# Patient Record
Sex: MALE | Race: WHITE
[De-identification: names, ages, dates, MRNs, and addresses within clinical notes are randomized per-mention and may not be internally consistent; named-entity substitution may affect disease eponyms.]

---

## 2019-10-21 ENCOUNTER — HOSPITAL ENCOUNTER (OUTPATIENT)
Dept: HOSPITAL 92 - CT | Age: 74
Discharge: HOME | End: 2019-10-21
Attending: UROLOGY
Payer: MEDICARE

## 2019-10-21 DIAGNOSIS — C61: Primary | ICD-10-CM

## 2019-10-21 DIAGNOSIS — N28.1: ICD-10-CM

## 2019-10-21 LAB — ESTIMATED GFR-MDRD - POC: (no result)

## 2019-10-21 PROCEDURE — 78306 BONE IMAGING WHOLE BODY: CPT

## 2019-10-21 PROCEDURE — A9503 TC99M MEDRONATE: HCPCS

## 2019-10-21 PROCEDURE — 82565 ASSAY OF CREATININE: CPT

## 2019-10-21 PROCEDURE — 74178 CT ABD&PLV WO CNTR FLWD CNTR: CPT

## 2019-10-21 NOTE — NM
WHOLE BODY BONE SCAN:



HISTORY: Prostate cancer 



RADIOPHARMACEUTICAL: 32.6 mCi technetium-99m MDP injected intravenously. 



COMPARISON: None



FINDINGS:

There is increased uptake in the shoulders, sternoclavicular joints, , and feet  consistent with dege
nerative changes. 



No other abnormal areas of tracer localization seen in the skeleton to suggest metastatic disease. 



Tracer excretion through the kidneys is within normal limits.



 IMPRESSION:

 No scintigraphic evidence of osseous metastatic disease. 



Reported By: Mikey Ahmadi 

Electronically Signed:  10/21/2019 12:52 PM

## 2019-10-21 NOTE — CT
CT Abdomen Pelvis W WO con: 10/21/2019 12:00 AM



CLINICAL HISTORY: Leakage while urinating. History of prostate cancer status post prostatectomy.



TECHNIQUE: 

Multiple contiguous axial images were obtained and a CT of the abdomen and pelvis without and with IV
 contrast. Postcontrast images were obtained in the nephrographic and excretory phases. Sagittal

and coronal reformats were performed.



COMPARISON: None.



FINDINGS: 



Kidneys and Urinary Tract: 



Right kidney and ureter: No calculi. No hydronephrosis or hydroureter. No renal mass or other lesions
. No urothelial lesions: no filling defect, dilation, stricture or wall thickening.

Left kidney and ureter: No calculi. No hydronephrosis or hydroureter. Subcentimeter hypodensity likel
y represents a cyst. No urothelial lesions: no filling defect, dilation, stricture or wall

thickening.

Urinary bladder: Normal, no calculi, mass or other lesions.



Remainder of Abdomen and Pelvis:



Liver: Normal.

Gallbladder and biliary system: Normal. No CT evident gallstones. No biliary ductal dilatation.

Spleen: Normal. 

Pancreas: Normal. 

Adrenal glands: Normal. 

GI tract: Normal. 

Abdominal aorta and its major branches: Atherosclerotic calcifications  No aneurysm.

Peritoneum/retroperitoneum: Normal.  No ascites. No adenopathy.

Pelvic structures: Normal. No pelvic lymphadenopathy.

Body wall and musculoskeletal: Normal. 

Visualized lower thorax: Normal. No pulmonary parenchymal 

mass or pleural effusion.



IMPRESSION:  



Tiny subcentimeter left renal cyst; otherwise unremarkable exam.



Reported By: Mikey Ahmadi 

Electronically Signed:  10/21/2019 9:35 AM

## 2023-06-27 ENCOUNTER — HOSPITAL ENCOUNTER (INPATIENT)
Dept: HOSPITAL 92 - SURG A | Age: 78
LOS: 5 days | Discharge: HOME | DRG: 236 | End: 2023-07-02
Attending: THORACIC SURGERY (CARDIOTHORACIC VASCULAR SURGERY) | Admitting: THORACIC SURGERY (CARDIOTHORACIC VASCULAR SURGERY)
Payer: MEDICARE

## 2023-06-27 VITALS — BODY MASS INDEX: 25.9 KG/M2

## 2023-06-27 DIAGNOSIS — Z90.49: ICD-10-CM

## 2023-06-27 DIAGNOSIS — K21.9: ICD-10-CM

## 2023-06-27 DIAGNOSIS — Z79.82: ICD-10-CM

## 2023-06-27 DIAGNOSIS — Z79.4: ICD-10-CM

## 2023-06-27 DIAGNOSIS — Z98.890: ICD-10-CM

## 2023-06-27 DIAGNOSIS — I48.91: ICD-10-CM

## 2023-06-27 DIAGNOSIS — E11.9: ICD-10-CM

## 2023-06-27 DIAGNOSIS — Z79.899: ICD-10-CM

## 2023-06-27 DIAGNOSIS — I10: ICD-10-CM

## 2023-06-27 DIAGNOSIS — Z95.5: ICD-10-CM

## 2023-06-27 DIAGNOSIS — I25.10: Primary | ICD-10-CM

## 2023-06-27 DIAGNOSIS — E78.2: ICD-10-CM

## 2023-06-27 LAB
ANALYZER IN CARDIO: (no result)
ANION GAP SERPL CALC-SCNC: 16 MMOL/L (ref 10–20)
ANION GAP SERPL CALC-SCNC: 9 MMOL/L (ref 10–20)
APTT PPP: 35.8 SEC (ref 22.9–36.1)
BASE EXCESS STD BLDA CALC-SCNC: -1.6 MEQ/L
BASE EXCESS STD BLDA CALC-SCNC: -3.6 MEQ/L
BASE EXCESS STD BLDA CALC-SCNC: -3.9 MEQ/L
BASE EXCESS STD BLDA CALC-SCNC: -4.3 MEQ/L
BASE EXCESS STD BLDA CALC-SCNC: -5.4 MEQ/L
BASE EXCESS STD BLDA CALC-SCNC: -5.5 MEQ/L
BASOPHILS # BLD AUTO: 0 THOU/UL (ref 0–0.2)
BASOPHILS NFR BLD AUTO: 0.2 % (ref 0–1)
BUN SERPL-MCNC: 17 MG/DL (ref 8.4–25.7)
BUN SERPL-MCNC: 21 MG/DL (ref 8.4–25.7)
CA-I BLDA-SCNC: 1.1 MMOL/L (ref 1.12–1.3)
CA-I BLDA-SCNC: 1.14 MMOL/L (ref 1.12–1.3)
CA-I BLDA-SCNC: 1.15 MMOL/L (ref 1.12–1.3)
CA-I BLDA-SCNC: 1.46 MMOL/L (ref 1.12–1.3)
CALCIUM SERPL-MCNC: 7.9 MG/DL (ref 7.8–10.44)
CALCIUM SERPL-MCNC: 9.5 MG/DL (ref 7.8–10.44)
CHLORIDE SERPL-SCNC: 106 MMOL/L (ref 98–107)
CHLORIDE SERPL-SCNC: 117 MMOL/L (ref 98–107)
CO2 SERPL-SCNC: 17 MMOL/L (ref 23–31)
CO2 SERPL-SCNC: 21 MMOL/L (ref 23–31)
CREAT CL PREDICTED SERPL C-G-VRATE: 65 ML/MIN (ref 70–130)
CREAT CL PREDICTED SERPL C-G-VRATE: 91 ML/MIN (ref 70–130)
EOSINOPHIL # BLD AUTO: 0.2 THOU/UL (ref 0–0.7)
EOSINOPHIL NFR BLD AUTO: 1 % (ref 0–10)
GLUCOSE SERPL-MCNC: 170 MG/DL (ref 83–110)
GLUCOSE SERPL-MCNC: 334 MG/DL (ref 83–110)
HCO3 BLDA-SCNC: 19 MEQ/L (ref 22–28)
HCO3 BLDA-SCNC: 20.1 MEQ/L (ref 22–28)
HCO3 BLDA-SCNC: 20.2 MEQ/L (ref 22–28)
HCO3 BLDA-SCNC: 20.5 MEQ/L (ref 22–28)
HCO3 BLDA-SCNC: 20.7 MEQ/L (ref 22–28)
HCO3 BLDA-SCNC: 22.5 MEQ/L (ref 22–28)
HCT VFR BLDA CALC: 27 % (ref 42–52)
HCT VFR BLDA CALC: 28 % (ref 42–52)
HCT VFR BLDA CALC: 34 % (ref 42–52)
HCT VFR BLDA CALC: 34 % (ref 42–52)
HCT VFR BLDA CALC: 36 % (ref 42–52)
HCT VFR BLDA CALC: 41 % (ref 42–52)
HGB BLD-MCNC: 10.6 G/DL (ref 14–18)
HGB BLD-MCNC: 11.4 G/DL (ref 14–18)
HGB BLD-MCNC: 14.8 G/DL (ref 14–18)
HGB BLDA-MCNC: 11.4 G/DL (ref 14–18)
HGB BLDA-MCNC: 11.4 G/DL (ref 14–18)
HGB BLDA-MCNC: 12.3 G/DL (ref 14–18)
HGB BLDA-MCNC: 14 G/DL (ref 14–18)
HGB BLDA-MCNC: 9.1 G/DL (ref 14–18)
HGB BLDA-MCNC: 9.5 G/DL (ref 14–18)
INR PPP: 1.5
LYMPHOCYTES NFR BLD AUTO: 6 % (ref 21–51)
MCH RBC QN AUTO: 33.9 PG (ref 27–31)
MCH RBC QN AUTO: 34.8 PG (ref 27–31)
MCV RBC AUTO: 98 FL (ref 78–98)
MCV RBC AUTO: 99.1 FL (ref 78–98)
MONOCYTES # BLD AUTO: 0.9 THOU/UL (ref 0.11–0.59)
MONOCYTES NFR BLD AUTO: 5 % (ref 0–10)
NEUTROPHILS # BLD AUTO: 15.4 THOU/UL (ref 1.4–6.5)
NEUTROPHILS NFR BLD AUTO: 86.8 % (ref 42–75)
O2 A-A PPRESDIFF RESPIRATORY: 124.12 MMHG (ref 0–20)
O2 A-A PPRESDIFF RESPIRATORY: 240.53 MMHG (ref 0–20)
PCO2 BLDA: 32.1 MMHG (ref 35–45)
PCO2 BLDA: 34.1 MMHG (ref 35–45)
PCO2 BLDA: 34.3 MMHG (ref 35–45)
PCO2 BLDA: 35.2 MMHG (ref 35–45)
PCO2 BLDA: 35.9 MMHG (ref 35–45)
PCO2 BLDA: 42 MMHG (ref 35–45)
PH BLDA: 7.31 [PH] (ref 7.35–7.45)
PH BLDA: 7.37 [PH] (ref 7.35–7.45)
PH BLDA: 7.38 [PH] (ref 7.35–7.45)
PH BLDA: 7.38 [PH] (ref 7.35–7.45)
PH BLDA: 7.42 [PH] (ref 7.35–7.45)
PH BLDA: 7.42 [PH] (ref 7.35–7.45)
PLATELET # BLD AUTO: 117 10X3/UL (ref 130–400)
PLATELET # BLD AUTO: 182 10X3/UL (ref 130–400)
PO2 BLDA: 116.2 MMHG (ref 70–?)
PO2 BLDA: 144.4 MMHG (ref 70–?)
PO2 BLDA: 297.6 MMHG (ref 70–?)
PO2 BLDA: 345.6 MMHG (ref 70–?)
PO2 BLDA: 409.2 MMHG (ref 70–?)
PO2 BLDA: 468.3 MMHG (ref 70–?)
POTASSIUM BLD-SCNC: 3.18 MMOL/L (ref 3.7–5.3)
POTASSIUM BLD-SCNC: 3.41 MMOL/L (ref 3.7–5.3)
POTASSIUM BLD-SCNC: 3.59 MMOL/L (ref 3.7–5.3)
POTASSIUM BLD-SCNC: 3.85 MMOL/L (ref 3.7–5.3)
POTASSIUM BLD-SCNC: 4.04 MMOL/L (ref 3.7–5.3)
POTASSIUM BLD-SCNC: 4.8 MMOL/L (ref 3.7–5.3)
POTASSIUM SERPL-SCNC: 3.4 MMOL/L (ref 3.5–5.1)
POTASSIUM SERPL-SCNC: 4 MMOL/L (ref 3.5–5.1)
POTASSIUM SERPL-SCNC: 5.1 MMOL/L (ref 3.5–5.1)
PROTHROMBIN TIME: 18.5 SEC (ref 12–14.7)
RBC # BLD AUTO: 3.05 MILL/UL (ref 4.7–6.1)
RBC # BLD AUTO: 4.36 MILL/UL (ref 4.7–6.1)
SODIUM SERPL-SCNC: 134 MMOL/L (ref 136–145)
SODIUM SERPL-SCNC: 144 MMOL/L (ref 136–145)
WBC # BLD AUTO: 17.8 10X3/UL (ref 4.8–10.8)
WBC # BLD AUTO: 8.6 10X3/UL (ref 4.8–10.8)

## 2023-06-27 PROCEDURE — 36430 TRANSFUSION BLD/BLD COMPNT: CPT

## 2023-06-27 PROCEDURE — 82805 BLOOD GASES W/O2 SATURATION: CPT

## 2023-06-27 PROCEDURE — 85027 COMPLETE CBC AUTOMATED: CPT

## 2023-06-27 PROCEDURE — 94640 AIRWAY INHALATION TREATMENT: CPT

## 2023-06-27 PROCEDURE — 97139 UNLISTED THERAPEUTIC PX: CPT

## 2023-06-27 PROCEDURE — 86850 RBC ANTIBODY SCREEN: CPT

## 2023-06-27 PROCEDURE — 93010 ELECTROCARDIOGRAM REPORT: CPT

## 2023-06-27 PROCEDURE — S0028 INJECTION, FAMOTIDINE, 20 MG: HCPCS

## 2023-06-27 PROCEDURE — P9045 ALBUMIN (HUMAN), 5%, 250 ML: HCPCS

## 2023-06-27 PROCEDURE — 80048 BASIC METABOLIC PNL TOTAL CA: CPT

## 2023-06-27 PROCEDURE — S0017 INJECTION, AMINOCAPROIC ACID: HCPCS

## 2023-06-27 PROCEDURE — 02100Z9 BYPASS CORONARY ARTERY, ONE ARTERY FROM LEFT INTERNAL MAMMARY, OPEN APPROACH: ICD-10-PCS | Performed by: THORACIC SURGERY (CARDIOTHORACIC VASCULAR SURGERY)

## 2023-06-27 PROCEDURE — 85610 PROTHROMBIN TIME: CPT

## 2023-06-27 PROCEDURE — 94150 VITAL CAPACITY TEST: CPT

## 2023-06-27 PROCEDURE — 06BP3ZZ EXCISION OF RIGHT SAPHENOUS VEIN, PERCUTANEOUS APPROACH: ICD-10-PCS | Performed by: THORACIC SURGERY (CARDIOTHORACIC VASCULAR SURGERY)

## 2023-06-27 PROCEDURE — C1751 CATH, INF, PER/CENT/MIDLINE: HCPCS

## 2023-06-27 PROCEDURE — 86900 BLOOD TYPING SEROLOGIC ABO: CPT

## 2023-06-27 PROCEDURE — 30233J1 TRANSFUSION OF NONAUTOLOGOUS SERUM ALBUMIN INTO PERIPHERAL VEIN, PERCUTANEOUS APPROACH: ICD-10-PCS | Performed by: THORACIC SURGERY (CARDIOTHORACIC VASCULAR SURGERY)

## 2023-06-27 PROCEDURE — 93005 ELECTROCARDIOGRAM TRACING: CPT

## 2023-06-27 PROCEDURE — 93798 PHYS/QHP OP CAR RHAB W/ECG: CPT

## 2023-06-27 PROCEDURE — 86901 BLOOD TYPING SEROLOGIC RH(D): CPT

## 2023-06-27 PROCEDURE — 85730 THROMBOPLASTIN TIME PARTIAL: CPT

## 2023-06-27 PROCEDURE — 85025 COMPLETE CBC W/AUTO DIFF WBC: CPT

## 2023-06-27 PROCEDURE — 94002 VENT MGMT INPAT INIT DAY: CPT

## 2023-06-27 PROCEDURE — 71045 X-RAY EXAM CHEST 1 VIEW: CPT

## 2023-06-27 PROCEDURE — 021109W BYPASS CORONARY ARTERY, TWO ARTERIES FROM AORTA WITH AUTOLOGOUS VENOUS TISSUE, OPEN APPROACH: ICD-10-PCS | Performed by: THORACIC SURGERY (CARDIOTHORACIC VASCULAR SURGERY)

## 2023-06-27 PROCEDURE — 36415 COLL VENOUS BLD VENIPUNCTURE: CPT

## 2023-06-27 PROCEDURE — 06BQ0ZZ EXCISION OF LEFT SAPHENOUS VEIN, OPEN APPROACH: ICD-10-PCS | Performed by: THORACIC SURGERY (CARDIOTHORACIC VASCULAR SURGERY)

## 2023-06-27 PROCEDURE — 4A133R1 MONITORING OF ARTERIAL SATURATION, PERIPHERAL, PERCUTANEOUS APPROACH: ICD-10-PCS | Performed by: THORACIC SURGERY (CARDIOTHORACIC VASCULAR SURGERY)

## 2023-06-27 PROCEDURE — 36416 COLLJ CAPILLARY BLOOD SPEC: CPT

## 2023-06-27 PROCEDURE — 02L70CK OCCLUSION OF LEFT ATRIAL APPENDAGE WITH EXTRALUMINAL DEVICE, OPEN APPROACH: ICD-10-PCS | Performed by: THORACIC SURGERY (CARDIOTHORACIC VASCULAR SURGERY)

## 2023-06-27 RX ADMIN — POTASSIUM CHLORIDE PRN MEQ: 14.9 INJECTION, SOLUTION INTRAVENOUS at 12:22

## 2023-06-27 RX ADMIN — POTASSIUM CHLORIDE PRN MEQ: 14.9 INJECTION, SOLUTION INTRAVENOUS at 19:12

## 2023-06-28 LAB
ANION GAP SERPL CALC-SCNC: 10 MMOL/L (ref 10–20)
BASOPHILS # BLD AUTO: 0.1 THOU/UL (ref 0–0.2)
BASOPHILS NFR BLD AUTO: 0.3 % (ref 0–1)
BUN SERPL-MCNC: 21 MG/DL (ref 8.4–25.7)
CALCIUM SERPL-MCNC: 7.9 MG/DL (ref 7.8–10.44)
CHLORIDE SERPL-SCNC: 111 MMOL/L (ref 98–107)
CO2 SERPL-SCNC: 22 MMOL/L (ref 23–31)
CREAT CL PREDICTED SERPL C-G-VRATE: 93 ML/MIN (ref 70–130)
EOSINOPHIL # BLD AUTO: 0 THOU/UL (ref 0–0.7)
EOSINOPHIL NFR BLD AUTO: 0.1 % (ref 0–10)
GLUCOSE SERPL-MCNC: 145 MG/DL (ref 83–110)
HGB BLD-MCNC: 10.4 G/DL (ref 14–18)
LYMPHOCYTES NFR BLD AUTO: 3.7 % (ref 21–51)
MCH RBC QN AUTO: 34.7 PG (ref 27–31)
MCV RBC AUTO: 100.3 FL (ref 78–98)
MONOCYTES # BLD AUTO: 1.3 THOU/UL (ref 0.11–0.59)
MONOCYTES NFR BLD AUTO: 8.8 % (ref 0–10)
NEUTROPHILS # BLD AUTO: 12.8 THOU/UL (ref 1.4–6.5)
NEUTROPHILS NFR BLD AUTO: 86.6 % (ref 42–75)
PLATELET # BLD AUTO: 118 10X3/UL (ref 130–400)
POTASSIUM SERPL-SCNC: 3.6 MMOL/L (ref 3.5–5.1)
RBC # BLD AUTO: 3 MILL/UL (ref 4.7–6.1)
SODIUM SERPL-SCNC: 139 MMOL/L (ref 136–145)
WBC # BLD AUTO: 14.7 10X3/UL (ref 4.8–10.8)

## 2023-06-28 RX ADMIN — INSULIN HUMAN PRN UNIT: 100 INJECTION, SOLUTION PARENTERAL at 09:12

## 2023-06-28 RX ADMIN — HYDROCODONE BITARTRATE AND ACETAMINOPHEN PRN TAB: 5; 325 TABLET ORAL at 01:30

## 2023-06-28 RX ADMIN — INSULIN HUMAN PRN UNIT: 100 INJECTION, SOLUTION PARENTERAL at 21:12

## 2023-06-28 RX ADMIN — ASPIRIN 81 MG CHEWABLE TABLET SCH MG: 81 TABLET CHEWABLE at 09:13

## 2023-06-28 RX ADMIN — INSULIN HUMAN PRN UNIT: 100 INJECTION, SOLUTION PARENTERAL at 15:57

## 2023-06-28 RX ADMIN — POTASSIUM CHLORIDE PRN MEQ: 14.9 INJECTION, SOLUTION INTRAVENOUS at 06:46

## 2023-06-28 RX ADMIN — INSULIN HUMAN PRN UNIT: 100 INJECTION, SOLUTION PARENTERAL at 12:09

## 2023-06-29 LAB
ANION GAP SERPL CALC-SCNC: 12 MMOL/L (ref 10–20)
BASOPHILS # BLD AUTO: 0.1 THOU/UL (ref 0–0.2)
BASOPHILS NFR BLD AUTO: 0.4 % (ref 0–1)
BUN SERPL-MCNC: 21 MG/DL (ref 8.4–25.7)
CALCIUM SERPL-MCNC: 8.5 MG/DL (ref 7.8–10.44)
CHLORIDE SERPL-SCNC: 108 MMOL/L (ref 98–107)
CO2 SERPL-SCNC: 21 MMOL/L (ref 23–31)
CREAT CL PREDICTED SERPL C-G-VRATE: 0 ML/MIN (ref 70–130)
EOSINOPHIL # BLD AUTO: 0.1 THOU/UL (ref 0–0.7)
EOSINOPHIL NFR BLD AUTO: 0.5 % (ref 0–10)
GLUCOSE SERPL-MCNC: 163 MG/DL (ref 83–110)
HGB BLD-MCNC: 11 G/DL (ref 14–18)
LYMPHOCYTES NFR BLD AUTO: 8.4 % (ref 21–51)
MCH RBC QN AUTO: 34.3 PG (ref 27–31)
MCV RBC AUTO: 99.4 FL (ref 78–98)
MONOCYTES # BLD AUTO: 1.4 THOU/UL (ref 0.11–0.59)
MONOCYTES NFR BLD AUTO: 9.5 % (ref 0–10)
NEUTROPHILS # BLD AUTO: 11.5 THOU/UL (ref 1.4–6.5)
NEUTROPHILS NFR BLD AUTO: 80.6 % (ref 42–75)
PLATELET # BLD AUTO: 120 10X3/UL (ref 130–400)
POTASSIUM SERPL-SCNC: 4.1 MMOL/L (ref 3.5–5.1)
RBC # BLD AUTO: 3.21 MILL/UL (ref 4.7–6.1)
SODIUM SERPL-SCNC: 137 MMOL/L (ref 136–145)
WBC # BLD AUTO: 14.2 10X3/UL (ref 4.8–10.8)

## 2023-06-29 RX ADMIN — INSULIN HUMAN PRN UNIT: 100 INJECTION, SOLUTION PARENTERAL at 15:27

## 2023-06-29 RX ADMIN — HYDROCODONE BITARTRATE AND ACETAMINOPHEN PRN TAB: 5; 325 TABLET ORAL at 19:30

## 2023-06-29 RX ADMIN — INSULIN HUMAN PRN UNIT: 100 INJECTION, SOLUTION PARENTERAL at 23:37

## 2023-06-29 RX ADMIN — INSULIN HUMAN PRN UNIT: 100 INJECTION, SOLUTION PARENTERAL at 04:27

## 2023-06-29 RX ADMIN — INSULIN GLARGINE SCH UNITS: 100 INJECTION, SOLUTION SUBCUTANEOUS at 20:56

## 2023-06-29 RX ADMIN — INSULIN HUMAN PRN UNIT: 100 INJECTION, SOLUTION PARENTERAL at 20:06

## 2023-06-29 RX ADMIN — ASPIRIN 81 MG CHEWABLE TABLET SCH MG: 81 TABLET CHEWABLE at 08:38

## 2023-06-29 RX ADMIN — INSULIN HUMAN PRN UNIT: 100 INJECTION, SOLUTION PARENTERAL at 00:22

## 2023-06-29 RX ADMIN — INSULIN GLARGINE SCH UNITS: 100 INJECTION, SOLUTION SUBCUTANEOUS at 08:41

## 2023-06-30 LAB
ANION GAP SERPL CALC-SCNC: 9 MMOL/L (ref 10–20)
BASOPHILS # BLD AUTO: 0 THOU/UL (ref 0–0.2)
BASOPHILS NFR BLD AUTO: 0.4 % (ref 0–1)
BUN SERPL-MCNC: 24 MG/DL (ref 8.4–25.7)
CALCIUM SERPL-MCNC: 8.5 MG/DL (ref 7.8–10.44)
CHLORIDE SERPL-SCNC: 107 MMOL/L (ref 98–107)
CO2 SERPL-SCNC: 27 MMOL/L (ref 23–31)
CREAT CL PREDICTED SERPL C-G-VRATE: 94 ML/MIN (ref 70–130)
EOSINOPHIL # BLD AUTO: 0.2 THOU/UL (ref 0–0.7)
EOSINOPHIL NFR BLD AUTO: 2.5 % (ref 0–10)
GLUCOSE SERPL-MCNC: 140 MG/DL (ref 83–110)
HGB BLD-MCNC: 10 G/DL (ref 14–18)
LYMPHOCYTES NFR BLD AUTO: 12.8 % (ref 21–51)
MCH RBC QN AUTO: 33.4 PG (ref 27–31)
MCV RBC AUTO: 97.7 FL (ref 78–98)
MONOCYTES # BLD AUTO: 1 THOU/UL (ref 0.11–0.59)
MONOCYTES NFR BLD AUTO: 11.3 % (ref 0–10)
NEUTROPHILS # BLD AUTO: 6.5 THOU/UL (ref 1.4–6.5)
NEUTROPHILS NFR BLD AUTO: 72.7 % (ref 42–75)
PLATELET # BLD AUTO: 124 10X3/UL (ref 130–400)
POTASSIUM SERPL-SCNC: 3.7 MMOL/L (ref 3.5–5.1)
RBC # BLD AUTO: 2.99 MILL/UL (ref 4.7–6.1)
SODIUM SERPL-SCNC: 139 MMOL/L (ref 136–145)
WBC # BLD AUTO: 8.9 10X3/UL (ref 4.8–10.8)

## 2023-06-30 RX ADMIN — AMIODARONE HYDROCHLORIDE SCH MLS: 50 INJECTION, SOLUTION INTRAVENOUS at 21:58

## 2023-06-30 RX ADMIN — POTASSIUM CHLORIDE PRN MEQ: 14.9 INJECTION, SOLUTION INTRAVENOUS at 07:05

## 2023-06-30 RX ADMIN — INSULIN HUMAN PRN UNIT: 100 INJECTION, SOLUTION PARENTERAL at 12:45

## 2023-06-30 RX ADMIN — INSULIN GLARGINE SCH UNITS: 100 INJECTION, SOLUTION SUBCUTANEOUS at 10:31

## 2023-06-30 RX ADMIN — AMIODARONE HYDROCHLORIDE SCH MLS: 50 INJECTION, SOLUTION INTRAVENOUS at 10:57

## 2023-06-30 RX ADMIN — AMIODARONE HYDROCHLORIDE SCH MLS: 50 INJECTION, SOLUTION INTRAVENOUS at 11:15

## 2023-06-30 RX ADMIN — INSULIN GLARGINE SCH UNITS: 100 INJECTION, SOLUTION SUBCUTANEOUS at 21:53

## 2023-06-30 RX ADMIN — ASPIRIN 81 MG CHEWABLE TABLET SCH MG: 81 TABLET CHEWABLE at 07:50

## 2023-06-30 RX ADMIN — INSULIN HUMAN PRN UNIT: 100 INJECTION, SOLUTION PARENTERAL at 04:13

## 2023-06-30 RX ADMIN — INSULIN HUMAN PRN UNIT: 100 INJECTION, SOLUTION PARENTERAL at 17:24

## 2023-07-01 LAB
ANION GAP SERPL CALC-SCNC: 12 MMOL/L (ref 10–20)
BASOPHILS # BLD AUTO: 0 THOU/UL (ref 0–0.2)
BASOPHILS NFR BLD AUTO: 0.3 % (ref 0–1)
BUN SERPL-MCNC: 16 MG/DL (ref 8.4–25.7)
CALCIUM SERPL-MCNC: 8.7 MG/DL (ref 7.8–10.44)
CHLORIDE SERPL-SCNC: 101 MMOL/L (ref 98–107)
CO2 SERPL-SCNC: 25 MMOL/L (ref 23–31)
CREAT CL PREDICTED SERPL C-G-VRATE: 84 ML/MIN (ref 70–130)
EOSINOPHIL # BLD AUTO: 0.2 THOU/UL (ref 0–0.7)
EOSINOPHIL NFR BLD AUTO: 2.1 % (ref 0–10)
GLUCOSE SERPL-MCNC: 282 MG/DL (ref 83–110)
HGB BLD-MCNC: 11.6 G/DL (ref 14–18)
LYMPHOCYTES NFR BLD AUTO: 12.9 % (ref 21–51)
MCH RBC QN AUTO: 34 PG (ref 27–31)
MCV RBC AUTO: 98.2 FL (ref 78–98)
MONOCYTES # BLD AUTO: 1 THOU/UL (ref 0.11–0.59)
MONOCYTES NFR BLD AUTO: 10.5 % (ref 0–10)
NEUTROPHILS # BLD AUTO: 7.3 THOU/UL (ref 1.4–6.5)
NEUTROPHILS NFR BLD AUTO: 73.8 % (ref 42–75)
PLATELET # BLD AUTO: 153 10X3/UL (ref 130–400)
POTASSIUM SERPL-SCNC: 4.4 MMOL/L (ref 3.5–5.1)
RBC # BLD AUTO: 3.41 MILL/UL (ref 4.7–6.1)
SODIUM SERPL-SCNC: 134 MMOL/L (ref 136–145)
WBC # BLD AUTO: 9.9 10X3/UL (ref 4.8–10.8)

## 2023-07-01 RX ADMIN — Medication SCH ML: at 21:33

## 2023-07-01 RX ADMIN — ASPIRIN 81 MG CHEWABLE TABLET SCH MG: 81 TABLET CHEWABLE at 09:48

## 2023-07-01 RX ADMIN — INSULIN HUMAN PRN UNIT: 100 INJECTION, SOLUTION PARENTERAL at 16:32

## 2023-07-01 RX ADMIN — INSULIN HUMAN PRN UNIT: 100 INJECTION, SOLUTION PARENTERAL at 07:29

## 2023-07-01 RX ADMIN — INSULIN GLARGINE SCH UNITS: 100 INJECTION, SOLUTION SUBCUTANEOUS at 21:32

## 2023-07-01 RX ADMIN — INSULIN GLARGINE SCH UNITS: 100 INJECTION, SOLUTION SUBCUTANEOUS at 09:47

## 2023-07-01 RX ADMIN — INSULIN HUMAN PRN UNIT: 100 INJECTION, SOLUTION PARENTERAL at 11:03

## 2023-07-02 VITALS — SYSTOLIC BLOOD PRESSURE: 137 MMHG | DIASTOLIC BLOOD PRESSURE: 69 MMHG

## 2023-07-02 VITALS — TEMPERATURE: 97.4 F

## 2023-07-02 RX ADMIN — Medication SCH ML: at 10:30

## 2023-07-02 RX ADMIN — ASPIRIN 81 MG CHEWABLE TABLET SCH MG: 81 TABLET CHEWABLE at 10:26

## 2023-07-02 RX ADMIN — AMIODARONE HYDROCHLORIDE SCH MLS: 50 INJECTION, SOLUTION INTRAVENOUS at 07:07

## 2023-07-02 RX ADMIN — INSULIN GLARGINE SCH: 100 INJECTION, SOLUTION SUBCUTANEOUS at 11:27
